# Patient Record
(demographics unavailable — no encounter records)

---

## 2024-12-13 NOTE — PHYSICAL EXAM
[Normal Appearance] : normal appearance [Well Groomed] : well groomed [General Appearance - In No Acute Distress] : no acute distress [Edema] : no peripheral edema [Respiration, Rhythm And Depth] : normal respiratory rhythm and effort [Exaggerated Use Of Accessory Muscles For Inspiration] : no accessory muscle use [Abdomen Soft] : soft [Abdomen Tenderness] : non-tender [Urinary Bladder Findings] : the bladder was normal on palpation [Normal Station and Gait] : the gait and station were normal for the patient's age [] : no rash [No Focal Deficits] : no focal deficits [Oriented To Time, Place, And Person] : oriented to person, place, and time [Affect] : the affect was normal [Mood] : the mood was normal [No Palpable Adenopathy] : no palpable adenopathy [de-identified] : mild right CVA tenderness

## 2024-12-13 NOTE — END OF VISIT
[Time Spent: ___ minutes] : I have spent [unfilled] minutes of time on the encounter which excludes teaching and separately reported services. [FreeTextEntry3] : Prior to appointment and during encounter with patient, medical records were reviewed including but not limited to, hospital records, outpatient records, imaging results and lab data if available. During this appointment the patient was examined, diagnoses were discussed and explained in a face-to-face manner. In addition, extensive time was spent reviewing aforementioned diagnostic studies. Counseling including test results, differential diagnoses, treatment options, risk and benefits, lifestyle changes, current condition, and current medications was performed. Patient's questions and concerns were addressed. Patient verbalized understanding of the treatment plan. Time spent is for reviewing chart, labs and images if available, counseling and care coordination.

## 2024-12-13 NOTE — HISTORY OF PRESENT ILLNESS
[FreeTextEntry1] : Patient is a 37yo F presenting for hospital followup after having a stent placed in her right ureter for an obstructing ureteral stone. She presented to the ER on 11/26/24 with severe sepsis and underwent the procedure urgently. Post-operatively she was admitted to the ICU for a short time. She was found to have positive blood culture and is still on oral antibiotic. She was eventually discharged.  Today reports discomfort with the stent. She has flank pain and right sided abdominal pain especially with urination. Has been taking oxybutynin and tylenol but no other pain medication. She is here with her sister who helps translate. She had hip pain in the hospital but that has resolved. Denies new fevers, chills, nausea, vomiting. Urine has been clear yellow.   I interpreted the CT from the hospital 11/26: hydronephrosis and perinephric stranding with stone in distal right ureter.  Urine culture and blood cultures were consistent with: E. Coli with resistance to bactrim, cipro, and ampicillin Creatinine, lactate, and troponins were elevated on admission - improved after stent placement.

## 2024-12-13 NOTE — ASSESSMENT
[FreeTextEntry1] : 37yo F s/p right ureteral stent placement on 11/26 for obstructing stone with pyelonephritis and severe sepsis. Now recovered.   Right ureteral stone We discussed the risks, benefits, and alternatives of ureteroscopy with laser lithotripsy/ablation including the risks of pain, dysuria, hematuria, urinary tract infection, urinary retention, injury to urethra/sphincter/bladder/ureter/renal pelvis, incomplete procedure, disease recurrence, stent discomfort. Reviewed the temporary nature of indwelling stents and the importance of follow up for interval stent removal. I answered the patient's questions. The patient expressed their understanding. - book for right ureteroscopy, LL, stent exchange - medical clearance prior - urine studies at [facility]  Ureteral stent discomfort - prescribed ibuprofen 400mg q6h prn, tamsulosin 0.4mg daily for stent discomfort